# Patient Record
Sex: FEMALE | Race: WHITE | NOT HISPANIC OR LATINO | Employment: STUDENT | URBAN - METROPOLITAN AREA
[De-identification: names, ages, dates, MRNs, and addresses within clinical notes are randomized per-mention and may not be internally consistent; named-entity substitution may affect disease eponyms.]

---
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
## 2025-01-12 ENCOUNTER — HEALTH MAINTENANCE LETTER (OUTPATIENT)
Age: 20
End: 2025-01-12

## 2025-01-20 ENCOUNTER — PATIENT OUTREACH (OUTPATIENT)
Dept: CARE COORDINATION | Facility: CLINIC | Age: 20
End: 2025-01-20
Payer: COMMERCIAL

## 2025-04-11 ENCOUNTER — DOCUMENTATION ONLY (OUTPATIENT)
Dept: FAMILY MEDICINE | Facility: CLINIC | Age: 20
End: 2025-04-11

## 2025-04-11 ENCOUNTER — E-VISIT (OUTPATIENT)
Dept: FAMILY MEDICINE | Facility: CLINIC | Age: 20
End: 2025-04-11
Payer: COMMERCIAL
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
## 2025-04-11 NOTE — PROGRESS NOTES
Call patient and notify her to complete an e-visit so I can place lab orders.  Discussed with patient to include time of the injury type of injury and any status known of the patient who shared the needle with her if they have HIV or hepatitis C or other known infectious diseases.              Patient sustained a A percutaneous injury with a subcutaneous insulin needle -after administering insulin to a patient    Please be sure you report this with your workplace as they typically have workplace policies for such exposures:    I would encourage them to determine the HIV status and hepatitis C status of the patient you had the shared injury with:  If unknown, the presence of HIV infection in the source patient should be determined in an expedited manner, preferably with a fourth-generation combination antibody-antigen test     If testing in the source patient is delayed, PEP should still be initiated while awaiting test results. If the source patient is found to be HIV negative, PEP can usually be discontinued unless acute HIV infection is suspected clinically. I     Taking of this medication has been shown to decrease HIV transmission by 80%    For those who opt to take PEP, blood testing (complete blood count and renal and hepatic function tests) to evaluate for drug toxicity is recommended, at least at baseline and at two weeks post-exposure.       This medication should be started as soon as possible and within 72 hours of the initial injury:  zidovudine      Baseline testing - All exposed individuals should have a baseline HIV test immediately after the exposure.  ?Follow up testing - Follow-up testing can be performed at six weeks and four months post-exposure if a fourth-generation antibody-antigen test is used. By contrast, if a test that only measures antibody is used, repeat HIV testing should occur at six weeks, three months, and six months following exposure.

## 2025-04-12 ENCOUNTER — NURSE TRIAGE (OUTPATIENT)
Dept: NURSING | Facility: CLINIC | Age: 20
End: 2025-04-12

## 2025-04-12 NOTE — TELEPHONE ENCOUNTER
Requesting lab test to be done. Poked with an insulin needle right middle finger tip after patient was injected. She did an evisit with Erin Harris yesterday to obtain a lab order.   [facility] Provider consult indicated.    Reason for page: lab order request    Specialty Group number: [id number]   Specialty Group: FM-Family Medicine    Initial page sent to Dr. RUSS Velez by RN at 9:32 am.   [facility] Provider, Dr. RUSS Velez returning page to Nurse Advisors at 9:35 am.     Provider recommended plan of care: should be seen today in [facility]--in person visit preferred so that lab work, prescription ordering etc everything can be done today.     Provider Recommendation Follow Up:   Reached patient. Informed of provider's recommendations. Patient verbalized understanding and agrees with the plan.   She intends to go to [facility] today.     Julia Corona RN Triage Nurse Advisor 9:41 AM 4/12/[id number]}     Reason for Disposition   [1] SOURCE person's HIV or hepatitis status is UNKNOWN (e.g., unable to test source, unknown source) AND [2] needlestick within past 72 hours    Additional Information   Negative: [1] SOURCE person is HIV POSITIVE AND [2] needlestick within past 72 hours   Negative: [1] SOURCE person is HEPATITIS B POSITIVE AND [2] needlestick within past 7 days AND [3] EXPOSED person NOT fully immunized against Hepatitis B (or does not know)   Negative: [1] Puncture is on the head, neck, chest, abdomen or overlying a joint AND [2] could be deep   Negative: Sensation of something still in the wound (i.e., needle broke off)   Negative: Skin is split open or gaping (or length > 1/2 inch or 12 mm)   Negative: Epinephrine (such as from Epi-Pen) injected into hand, finger, foot, or toe (Exception: If previously discharged injector, continue triage.)   Negative: Epinephrine injected into safe site (not into hand, finger, foot, or toe) (Exception: Previously discharged injector.)   Negative: [1] SOURCE person  is HIGHER RISK (e.g., residential inmate, injection drug user) AND [2] needlestick within past 72 hours   Negative: Patient sounds very sick or weak to the triager    Protocols used: [id number]-OG

## 2025-04-13 ENCOUNTER — LAB (OUTPATIENT)
Dept: LAB | Facility: CLINIC | Age: 20
End: 2025-04-13
Payer: COMMERCIAL
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
## 2025-04-13 LAB
HBV SURFACE AB SERPL IA-ACNC: 6.95 M[IU]/ML
HBV SURFACE AB SERPL IA-ACNC: NONREACTIVE M[IU]/ML
HCV AB SERPL QL IA: NONREACTIVE
HIV 1+2 AB+HIV1 P24 AG SERPL QL IA: NONREACTIVE

## 2025-04-13 PROCEDURE — 87389 HIV-1 AG W/HIV-1&-2 AB AG IA: CPT

## 2025-04-13 PROCEDURE — 86706 HEP B SURFACE ANTIBODY: CPT

## 2025-04-13 PROCEDURE — 36415 COLL VENOUS BLD VENIPUNCTURE: CPT

## 2025-04-13 PROCEDURE — 86803 HEPATITIS C AB TEST: CPT

## 2025-05-08 ENCOUNTER — OFFICE VISIT (OUTPATIENT)
Dept: OBGYN | Facility: CLINIC | Age: 20
End: 2025-05-08
Payer: COMMERCIAL

## 2025-05-08 ENCOUNTER — RESULTS FOLLOW-UP (OUTPATIENT)
Dept: OBGYN | Facility: CLINIC | Age: 20
End: 2025-05-08

## 2025-05-08 VITALS — SYSTOLIC BLOOD PRESSURE: 104 MMHG | WEIGHT: 132.4 LBS | DIASTOLIC BLOOD PRESSURE: 68 MMHG | BODY MASS INDEX: 22.73 KG/M2

## 2025-05-08 DIAGNOSIS — B37.31 YEAST VAGINITIS: ICD-10-CM

## 2025-05-08 DIAGNOSIS — N89.8 VAGINAL DISCHARGE: Primary | ICD-10-CM

## 2025-05-08 LAB
CLUE CELLS: ABNORMAL
TRICHOMONAS, WET PREP: ABNORMAL
WBC'S/HIGH POWER FIELD, WET PREP: ABNORMAL
YEAST, WET PREP: ABNORMAL

## 2025-05-08 RX ORDER — FLUCONAZOLE 150 MG/1
150 TABLET ORAL ONCE
Qty: 1 TABLET | Refills: 0 | Status: SHIPPED | OUTPATIENT
Start: 2025-05-08 | End: 2025-05-08

## 2025-05-08 NOTE — PROGRESS NOTES
SUBJECTIVE:   CC: vaginal odor, discharge, soreness                                               Indira Orr is a 20 year old  female who presents to clinic today for initial gyn evaluation of vaginal discharge, irritation, odor, and burning after intercourse. Sx all intermittent throughout the last month. No hx yeast or BV. Discharge is thick, white, and occasionally curd-like.    Problem list and histories reviewed & adjusted, as indicated.  Additional history: as documented.      Patient Active Problem List   Diagnosis    MENTAL HEALTH    Depressed    ADHD    Current moderate episode of major depressive disorder without prior episode (H)    Generalized anxiety disorder    Hx of self-harm    Drug abuse in remission (H)    Epigastric pain    Elevated serum free T4 level     Past Surgical History:   Procedure Laterality Date    AS RAD RESEC TONSIL/PILLARS      FACIAL RECONSTRUCTION SURGERY      3 surgeries after bitten by a dog, 12 years ago.      Social History     Tobacco Use    Smoking status: Never    Smokeless tobacco: Current    Tobacco comments:     vape   Substance Use Topics    Alcohol use: Yes     Comment: rarely      Problem (# of Occurrences) Relation (Name,Age of Onset)    Anxiety Disorder (1) Mother    Bipolar Disorder (1) Paternal Grandfather    Breast Cancer (1) Other: maternal great grandmother and three female siblings    Polycystic ovary syndrome (2) Paternal Grandmother, Maternal Aunt    Hyperlipidemia (1) Father    Coronary Artery Disease (1) Maternal Grandfather    Other - See Comments (1) Father: anger problems according to mother              Current Outpatient Medications   Medication Sig Dispense Refill    EPINEPHrine (ANY BX GENERIC EQUIV) 0.3 MG/0.3ML injection 2-pack Inject 0.3 mLs (0.3 mg) into the muscle as needed for anaphylaxis (rapid spreading of hives, difficulty breathing) May repeat one time in 5-15 minutes if response to initial dose is inadequate. 2 each 0     melatonin 5 MG tablet Take 1 tablet (5 mg) by mouth At Bedtime 30 tablet 0    omeprazole (PRILOSEC) 20 MG DR capsule Take 1 capsule (20 mg) by mouth daily. 90 capsule 1     No current facility-administered medications for this visit.     Allergies   Allergen Reactions    Penicillins Anaphylaxis       OBJECTIVE:   /68   Wt 60.1 kg (132 lb 6.4 oz)   LMP 2025 (Approximate)   BMI 22.73 kg/m     Const: sitting in chair in no acute distress, comfortable  Eyes: no scleral icterus, EOMI  CV: regular rate, well perfused  Pulm: no increased work of breathing, no cough  Skin: warm and dry, no rashes/lesions  Psych: mood stable, appropriate affect  Abd: soft, no hepatosplenomegaly, non-tender to palpation  Neuro: A+Ox3   : External genitalia normal well-estrogenized, healthy tissue.  No obvious excoriations, lesions, or rashes. Bartholins, urethra, normal.  Normal moist pink vaginal mucosa.  SSE: Normal cervix, copious thick white discharge    ASSESSMENT/PLAN:                                                    Indira Orr is a 20 year old female  here for initial gyn evaluation of discharge.     1. Vaginal discharge (Primary)  - NEISSERIA GONORRHOEA PCR; Future  - CHLAMYDIA TRACHOMATIS PCR; Future  - Wet prep - Clinic Collect    2. Yeast vaginitis  - exam and hx c/w yeast vaginitis. Plan to proceed with treatment. Wet prep pending.  - fluconazole (DIFLUCAN) 150 MG tablet; Take 1 tablet (150 mg) by mouth once for 1 dose.  Dispense: 1 tablet; Refill: 0           Barbara De Anda MD  Obstetrics and Gynecology   [facility]

## 2025-05-08 NOTE — NURSING NOTE
"Chief Complaint   Patient presents with    Vaginal Problem     Odor, \"soreness\", discharge for the past month. Sx are off and on. Patient reports burning after intercourse.        Initial /68   Wt 60.1 kg (132 lb 6.4 oz)   LMP 2025 (Approximate)   BMI 22.73 kg/m   Estimated body mass index is 22.73 kg/m  as calculated from the following:    Height as of 24: 1.626 m (5' 4\").    Weight as of this encounter: 60.1 kg (132 lb 6.4 oz).  BP completed using cuff size: regular    Questioned patient about current smoking habits.  Pt. has never smoked.          The following HM Due: Zach ()      Madalyn Valdez CMA               "